# Patient Record
Sex: FEMALE | Race: WHITE | Employment: FULL TIME | URBAN - METROPOLITAN AREA
[De-identification: names, ages, dates, MRNs, and addresses within clinical notes are randomized per-mention and may not be internally consistent; named-entity substitution may affect disease eponyms.]

---

## 2021-10-22 LAB
EXTERNAL ABO GROUPING: NORMAL
EXTERNAL ABO GROUPING: NORMAL
EXTERNAL ANTIBODY SCREEN: NORMAL
EXTERNAL CHLAMYDIA SCREEN: NORMAL
EXTERNAL GONORRHEA SCREEN: NORMAL
EXTERNAL HEMOGLOBIN: 13.6 G/DL
EXTERNAL HEPATITIS B SURFACE ANTIGEN: NORMAL
EXTERNAL HEPATITIS B SURFACE ANTIGEN: NORMAL
EXTERNAL HIV-1/2 AB-AG: NORMAL
EXTERNAL HIV-1/2 AB-AG: NORMAL
EXTERNAL RH FACTOR: POSITIVE
EXTERNAL RH FACTOR: POSITIVE
EXTERNAL SYPHILIS RPR SCREEN: NORMAL
EXTERNAL SYPHILIS RPR SCREEN: NORMAL

## 2022-02-15 ENCOUNTER — TELEPHONE (OUTPATIENT)
Dept: OBGYN CLINIC | Facility: CLINIC | Age: 37
End: 2022-02-15

## 2022-02-15 ENCOUNTER — OFFICE VISIT (OUTPATIENT)
Dept: OBGYN CLINIC | Facility: CLINIC | Age: 37
End: 2022-02-15
Payer: COMMERCIAL

## 2022-02-15 VITALS
BODY MASS INDEX: 23.04 KG/M2 | TEMPERATURE: 98.7 F | WEIGHT: 146.8 LBS | HEIGHT: 67 IN | OXYGEN SATURATION: 98 % | HEART RATE: 84 BPM | DIASTOLIC BLOOD PRESSURE: 78 MMHG | SYSTOLIC BLOOD PRESSURE: 116 MMHG

## 2022-02-15 DIAGNOSIS — Z3A.10 10 WEEKS GESTATION OF PREGNANCY: ICD-10-CM

## 2022-02-15 DIAGNOSIS — Z14.8 GENETIC CARRIER OF OTHER DISEASE: ICD-10-CM

## 2022-02-15 DIAGNOSIS — E03.8 SUBCLINICAL HYPOTHYROIDISM: ICD-10-CM

## 2022-02-15 DIAGNOSIS — O09.521 SUPERVISION OF HIGH RISK ELDERLY MULTIGRAVIDA IN FIRST TRIMESTER: Primary | ICD-10-CM

## 2022-02-15 PROCEDURE — 99203 OFFICE O/P NEW LOW 30 MIN: CPT | Performed by: OBSTETRICS & GYNECOLOGY

## 2022-02-15 PROCEDURE — 76801 OB US < 14 WKS SINGLE FETUS: CPT | Performed by: OBSTETRICS & GYNECOLOGY

## 2022-02-15 RX ORDER — LEVOTHYROXINE SODIUM 25 MCG
25 TABLET ORAL DAILY
COMMUNITY
Start: 2022-02-10 | End: 2022-03-11 | Stop reason: SDUPTHER

## 2022-02-15 RX ORDER — SWAB
1 SWAB, NON-MEDICATED MISCELLANEOUS DAILY
COMMUNITY

## 2022-02-15 NOTE — ASSESSMENT & PLAN NOTE
Some records were able to be obtained from [facility] today: dating, labwork  TVUS today shows SLIUP c/w LMP and established dating   Reviewed relevant history, anticipatory guidance for pregnancy, answered questions regarding exercise, travel, OTC meds for nausea, caffeine intake  NOB folder provided   Return in 1-2 weeks for OB intake at which point additional lab work can be obtained  MFM referral placed for NT/NIPT which pt is interested in - she is not interested in invasive genetic testing   Reviewed benefits of influenza vaccination in pregnancy including but not limited to reduction in maternal influenza hospitalization, reduction in risk of stillbirth, and reduction in influenza-related morbidity and mortality among infants  Reviewed safety of influenza vaccine in pregnancy and overall very low risk of reaction or adverse effects  Patient voiced understanding of all this and DECLINES vaccination today     OB, COVID precautions reviewed

## 2022-02-15 NOTE — PATIENT INSTRUCTIONS
Referral for maternal fetal medicine placed: they will reach out to schedule ultrasound      Pregnancy at 11 to 14 [address] [place] [address] Drive:   What changes are happening in my body? You are now at the end of your first trimester and entering your second trimester  Morning sickness usually goes away by this time  You may have other symptoms such as fatigue, frequent urination, and headaches  You may have gained 2 to 4 pounds by now  How do I care for myself at this stage of my pregnancy? · Get plenty of rest   You may feel more tired than normal  You may need to take naps or go to bed earlier  · Manage nausea and vomiting  Avoid fatty and spicy foods  Eat small meals throughout the day instead of large meals  Mary Beth may help to decrease nausea  Ask your healthcare provider about other ways of decreasing nausea and vomiting  · Eat a variety of healthy foods  Healthy foods include fruits, vegetables, whole-grain breads, low-fat dairy foods, beans, lean meats, and fish  Drink liquids as directed  Ask how much liquid to drink each day and which liquids are best for you  Limit caffeine to less than 200 milligrams each day  Limit your intake of fish to 2 servings each week  Choose fish low in mercury such as canned light tuna, shrimp, salmon, cod, or tilapia  Do not  eat fish high in mercury such as swordfish, tilefish, otto mackerel, and shark  · Take prenatal vitamins as directed  Your need for certain vitamins and minerals, such as folic acid, increases during pregnancy  Prenatal vitamins provide some of the extra vitamins and minerals you need  Prenatal vitamins may also help to decrease the risk of certain birth defects  · Do not smoke  Smoking increases your risk of a miscarriage and other health problems during your pregnancy  Smoking can cause your baby to be born too early or weigh less at birth  Ask your healthcare provider for information if you need help quitting      · Do not drink alcohol  Alcohol passes from your body to your baby through the placenta  It can affect your baby's brain development and cause fetal alcohol syndrome (FAS)  FAS is a group of conditions that causes mental, behavior, and growth problems  · Talk to your healthcare provider before you take any medicines  Many medicines may harm your baby if you take them when you are pregnant  Do not take any medicines, vitamins, herbs, or supplements without first talking to your healthcare provider  Never use illegal or street drugs (such as marijuana or cocaine) while you are pregnant  What are some safety tips during pregnancy? · Avoid hot tubs and saunas  Do not use a hot tub or sauna while you are pregnant, especially during your first trimester  Hot tubs and saunas may raise your baby's temperature and increase the risk of birth defects  · Avoid toxoplasmosis  This is an infection caused by eating raw meat or being around infected cat feces  It can cause birth defects, miscarriages, and other problems  Wash your hands after you touch raw meat  Make sure any meat is well-cooked before you eat it  Avoid raw eggs and unpasteurized milk  Use gloves or ask someone else to clean your cat's litter box while you are pregnant  What changes are happening with my baby? Your baby has fully formed fingernails and toenails  Your baby's heartbeat can now be heard  Ask your healthcare provider if you can listen to your baby's heartbeat  By week 14, your baby is over 4 inches long from the top of the head to the rump (baby's bottom)  Your baby weighs over 3 ounces  What do I need to know about prenatal care? Prenatal care is a series of visits with your healthcare provider throughout your pregnancy  During the first 28 weeks of your pregnancy, you will see your healthcare provider 1 time each month  Prenatal care can help prevent problems during pregnancy and childbirth   Your healthcare provider will check your blood pressure and weight  Your baby's heart rate will also be checked  You may also need the following at some visits:  · A pelvic exam  allows your healthcare provider to see your cervix (the bottom part of your uterus)  Your healthcare provider will use a speculum to open your vagina  He or she will check the size and shape of your uterus  · Blood tests  may be done to check for any of the following:    ? Gestational diabetes or anemia (low iron level)    ? Blood type or Rh factor, or certain birth defects    ? Immunity to certain diseases, such as chickenpox or rubella    ? An infection, such as a sexually transmitted infection, HIV, or hepatitis B    · Hepatitis B  may need to be prevented or treated  Hepatitis B is inflammation of the liver caused by the hepatitis B virus (HBV)  HBV can spread from a mother to her baby during delivery  You will be checked for HBV as early as possible in the first trimester of each pregnancy  You need the test even if you received the hepatitis B vaccine or were tested before  You may need to have an HBV infection treated before you give birth  · Urine tests  may also be done to check for sugar and protein  These can be signs of gestational diabetes or preeclampsia  Urine tests may also be done to check for signs of infection  · A fetal ultrasound  shows pictures of your baby inside your uterus  The pictures are used to check your baby's development, movement, and position  · Genetic disorder screening tests  may be offered to you  These tests check your baby's risk for genetic disorders such as Down syndrome  A screening test includes a blood test and ultrasound  When should I seek immediate care? · You have pain or cramping in your abdomen or low back  · You have heavy vaginal bleeding or clotting  · You pass material that looks like tissue or large clots  Collect the material and bring it with you  When should I call my doctor or obstetrician? · You cannot keep food or drinks down, and you are losing weight  · You have light bleeding  · You have chills or a fever  · You have vaginal itching, burning, or pain  · You have yellow, green, white, or foul-smelling vaginal discharge  · You have pain or burning when you urinate, less urine than usual, or pink or bloody urine  · You have questions or concerns about your condition or care  CARE AGREEMENT:   You have the right to help plan your care  Learn about your health condition and how it may be treated  Discuss treatment options with your healthcare providers to decide what care you want to receive  You always have the right to refuse treatment  The above information is an  only  It is not intended as medical advice for individual conditions or treatments  Talk to your doctor, nurse or pharmacist before following any medical regimen to see if it is safe and effective for you  © Copyright [employer] 2021 Information is for End User's use only and may not be sold, redistributed or otherwise used for commercial purposes   All illustrations and images included in CareNotes® are the copyrighted property of A D A M , Inc  or [address] [place] [address] [employer]

## 2022-02-15 NOTE — TELEPHONE ENCOUNTER
Record release form filled out and signed by patient  Faxed and received confirmation fax sheet  TFFLE:[phone]

## 2022-02-15 NOTE — PROGRESS NOTES
Subjective   Patient ID: Sarah Miner is a 29 y o  female  Patient is here for a problem visit  Chief Complaint   Patient presents with    Initial Prenatal Visit     Pt has no records from [facility] yet, pt has no concerns, LMP 21     New patient - establishing from [facility]  Was able to get some records faxed during visit   LMP 21 - KYREE 22, 10+0 WGA   KYREE 9/15/22 per [facility] - 9+5 WGA by ovulation date     Referred to [facility] for low AMH - pt had egg freezing done in 2021  Then spontaneously conceived 3 months after that  Has been going to [facility] for serial US and labwork  She was not on any supplemental progesterone or other medication  Last US at [facility] [facility] approximately 1 week ago  Had carrier screening prior to conceiving - 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  Partner has not been tested yet  She reports undergoing telephone genetic counseling for this  Reports fatigue, nausea, GERD but very rare vomiting  Denies cramping or VB  Menstrual History:  OB History        2    Para   0    Term   0       0    AB   1    Living   0       SAB   0    IAB   0    Ectopic   0    Multiple   0    Live Births   0                  Patient's last menstrual period was 2021 (exact date)           Past Medical History:   Diagnosis Date    Hypothyroidism        Past Surgical History:   Procedure Laterality Date    STRABISMUS SURGERY         Social History     Tobacco Use    Smoking status: Never Smoker    Smokeless tobacco: Never Used   Vaping Use    Vaping Use: Never used   Substance Use Topics    Alcohol use: Never    Drug use: Never        No Known Allergies      Current Outpatient Medications:     Calcium Carbonate Antacid (TUMS PO), Take by mouth, Disp: , Rfl:     Prenatal Vit-Fe Fumarate-FA (prenatal multivitamin) 28-0 8 MG TABS, Take 1 tablet by mouth daily, Disp: , Rfl:     Synthroid 25 MCG tablet, Take 25 mcg by mouth daily, Disp: , Rfl:       Review of Systems Constitutional: Negative for chills and fever  Eyes: Negative for visual disturbance  Respiratory: Negative for chest tightness and shortness of breath  Cardiovascular: Negative for chest pain  Gastrointestinal: Negative for abdominal pain, diarrhea, nausea and vomiting  Genitourinary: Negative for pelvic pain and vaginal bleeding  As noted in HPI   Skin: Negative for rash  Neurological: Negative for headaches  All other systems reviewed and are negative  /78 (BP Location: Left arm, Patient Position: Sitting, Cuff Size: Adult)   Pulse 84   Temp 98 7 °F (37 1 °C) (Tympanic)   Ht 5' 7" (1 702 m)   Wt 66 6 kg (146 lb 12 8 oz)   LMP 12/07/2021 (Exact Date)   SpO2 98%   Breastfeeding No   BMI 22 99 kg/m²       Physical Exam  Constitutional:       General: She is not in acute distress  Appearance: Normal appearance  HENT:      Head: Normocephalic and atraumatic  Cardiovascular:      Rate and Rhythm: Normal rate  Pulmonary:      Effort: Pulmonary effort is normal  No respiratory distress  Abdominal:      General: There is no distension  Palpations: Abdomen is soft  Tenderness: There is no abdominal tenderness  There is no guarding or rebound  Neurological:      General: No focal deficit present  Mental Status: She is alert  Psychiatric:         Mood and Affect: Mood normal          Behavior: Behavior normal    Vitals and nursing note reviewed             Results for orders placed or performed in visit on 02/15/22   Chlamydia/GC amplified DNA by PCR   Result Value Ref Range    External Gonorrhea Screen neg     External Chlamydia Screen neg    Hemoglobin   Result Value Ref Range    External Hemoglobin 13 6 g/dL   HIV 1/2 Antigen/Antibody (4th Generation) w Reflex [facility]   Result Value Ref Range    HIV-1/HIV-2 AB Non-Reactive    Hepatitis B surface antigen   Result Value Ref Range    Hepatitis B Surface Ag neg    RPR   Result Value Ref Range    RPR Non-Reactive    Antibody screen   Result Value Ref Range    External Antibody Screen Normal    ABO/Rh   Result Value Ref Range    ABO Grouping A     External Rh Factor Positive        Appropriate laboratory testing, imaging studies, and prior external records were reviewed    US < 14 weeks     Gestational sac present  Yolk sac present  Fetal pole present  Fetal cardiac activity noted  FHR: 168 bpm     CRL: 9+4 WGA  days  EDC by US 9/16/22  EDC by LMP 9/13/22      L ovary normal  R ovary normal    Ultrasound Probe Disinfection     A transvaginal ultrasound was performed  Prior to use, disinfection was performed with High Level Disinfection Process ([employer] (formerly known as [employer]))  Probe serial number F: [id number] was used  Assessment/Plan:       Problem List        Endocrine    Subclinical hypothyroidism    Overview     TSH 1/10/22 3 22, synthroid 25 mcg started by A  TSH 2/10/22 2 97         Current Assessment & Plan     Taking synthroid 25 mcg daily             Other    Supervision of high risk elderly multigravida in first trimester    Overview     A reports KYREE by ovulation date as 9/15/22  KYREE by LMP is 9/13/22   Conceived spontaneously, no infertility tx required  S/p COVID vax x 2, declines booster  Declines flu vaccine   Interested in NIPT          Current Assessment & Plan     Some records were able to be obtained from [facility] today: dating, labwork  TVUS today shows SLIUP c/w LMP and established dating   Reviewed relevant history, anticipatory guidance for pregnancy, answered questions regarding exercise, travel, OTC meds for nausea, caffeine intake  NOB folder provided   Return in 1-2 weeks for OB intake at which point additional lab work can be obtained   MFM referral placed for NT/NIPT which pt is interested in - she is not interested in invasive genetic testing   Reviewed benefits of influenza vaccination in pregnancy including but not limited to reduction in maternal influenza hospitalization, reduction in risk of stillbirth, and reduction in influenza-related morbidity and mortality among infants  Reviewed safety of influenza vaccine in pregnancy and overall very low risk of reaction or adverse effects  Patient voiced understanding of all this and DECLINES vaccination today     OB, COVID precautions reviewed            10 weeks gestation of pregnancy    Genetic carrier of other disease    Overview     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

## 2022-02-16 ENCOUNTER — OB ABSTRACT (OUTPATIENT)
Dept: OBGYN CLINIC | Facility: CLINIC | Age: 37
End: 2022-02-16

## 2022-02-24 ENCOUNTER — TELEPHONE (OUTPATIENT)
Dept: OBGYN CLINIC | Facility: CLINIC | Age: 37
End: 2022-02-24

## 2022-02-24 NOTE — TELEPHONE ENCOUNTER
The patient called and asked that if the Synthroid she is currently on would have to be adjusted since she is pregnant  Was told by her doctor that once she was in the second trimester it would be adjusted  Please advise  I do not see any labs in for TSH

## 2022-03-08 ENCOUNTER — NURSE TRIAGE (OUTPATIENT)
Dept: OTHER | Facility: OTHER | Age: 37
End: 2022-03-08

## 2022-03-09 PROBLEM — Z3A.13 13 WEEKS GESTATION OF PREGNANCY: Status: ACTIVE | Noted: 2022-02-15

## 2022-03-09 NOTE — TELEPHONE ENCOUNTER
Reason for Disposition   SPOTTING (single or brief episode)    Answer Assessment - Initial Assessment Questions  1  ONSET: "When did this bleeding start?"        Spotting started a few minutes ago    2  DESCRIPTION: "Describe the bleeding that you are having " "How much bleeding is there?"     - SPOTTING: spotting, or pinkish / brownish mucous discharge; does not fill panti-liner or pad     - MILD:  less than 1 pad / hour; less than patient's usual menstrual bleeding    - MODERATE: 1-2 pads / hour; 1 menstrual cup every 6 hours; small-medium blood clots (e g , pea, grape, small coin)    - SEVERE: soaking 2 or more pads/hour for 2 or more hours; 1 menstrual cup every 2 hours; bleeding not contained by pads or continuous red blood from vagina; large blood clots (e g , golf ball, large coin)       Spotting    3  ABDOMINAL PAIN SEVERITY: If present, ask: "How bad is it?"  (e g , Scale 1-10; mild, moderate, or severe)    - MILD (1-3): doesn't interfere with normal activities, abdomen soft and not tender to touch     - MODERATE (4-7): interferes with normal activities or awakens from sleep, tender to touch     - SEVERE (8-10): excruciating pain, doubled over, unable to do any normal activities      Denies     4  PREGNANCY: "Do you know how many weeks or months pregnant you are?" "When was the first day of your last normal menstrual period?"      12w5d pregnant    5  HEMODYNAMIC STATUS: "Are you weak or feeling lightheaded?" If Yes, ask: "Can you stand and walk normally?"       Denies, can stand and walk normally    6   OTHER SYMPTOMS: "What other symptoms are you having with the bleeding?" (e g , passed tissue, vaginal discharge, fever, menstrual-type cramps)      Nausea due to anxiety per pt    Protocols used: PREGNANCY - VAGINAL BLEEDING LESS THAN 20 WEEKS [facility]

## 2022-03-09 NOTE — TELEPHONE ENCOUNTER
Spoke with pt who states spotting has not happened again  She is currently out of state and is scheudled to fly back today, and already has her OB intake/exam scheduled for tomorrow   Advised pt to keep appt as is and we will see her tomorrow and to call if anything changes

## 2022-03-09 NOTE — PROGRESS NOTES
Subjective  Patient ID: Mary See is a 29 y o  female here for OB intake  She is accompanied by: FOB    Pregnancy was planned  Reports the follow common c/o in early pregnancy: breast tenderness, fatigue and nausea  Her Patient's last menstrual period was 2021 (exact date)  giving her an KYREE of 9/15/22  She reports her menstrual cycle is regular    She has had a pregnancy confirmation appointment and previous US on 2/15 Demonstrated viable IUP c/w GA by LMP    Obstetric history reviewed/updated:  OB History    Para Term  AB Living   2 0 0 0 1 0   SAB IAB Ectopic Multiple Live Births   0 0 0 0 0      # Outcome Date GA Lbr Hari/2nd Weight Sex Delivery Anes PTL Lv   2 Current            1 AB 2019 9w0d    SAB          Previous Pregnancy Complications/Hx: none    The following portions of the patient's history were reviewed and updated as appropriate: allergies, current medications, past family history, past medical history, past social history, past surgical history, and problem list   + for subclinical hypothyroid- started on meds by infertility specialist    Diabetes risk Factors: The following hx was assessed r/t risk for diabetes in pregnancy: Pregestational DM, hx of GDM, BMI >35, 1st degree relative w/ T2 DM, personal hx of PCOS, Metformin use, Prior baby LGA/macrosomia  Pertinent positive: none     Hypertension  The following hx was assessed r/t risk for HTN disorder in pregnancy:   (Risk level High) prior hx of CHTN, gHTN, Pre-eclampsia (or eclampsia or HELLP syndrome), FH  pre-eclampsia, Multifetal gestation, Type 1 or Type 2 DM, renal disease, Autoimmune disease, Nulliparity;   (Risk level Mod) BMI > 30, > age 28, > 10 yr pregnancy interval, Previous IUGR/SGA baby, race  Pertinent positive: AMA    Family genetic history completed: unremarkable other than AMA     Pre-Pregnancy weight: 61 7 kg (136 lb)  Pre-gravid BMI: 21 30        Infection/Exposure Risk assessment  Employed: yes  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  no  Recent COVID Infection or exposure:   no  Recent Travel Screening  Traveled outside the [address] [place] [address] Road  in the last month?: No  Planned Travel Screening  Planned travel outside the [address] [place] [address] Road  in the next 12 months?: No  Immunizations:   Vaccinated against Hep B: yes   Previous VZV vaccine or chicken pox disease  yes   influenza vaccine given this flu season: no    Covid-19/SARS vaccine: yes   Discussed Tdap vaccine administration at 27-28 weeks    Substance and Domestic Abuse Screening  None   SBIRT  Have you ever used drugs or Alcohol during Pregnancy? : No  Have you had a problem with drugs or alcohol in the Past? : No  Does your partner have a problem with drugs or alcohol? : No  Do you consider one of your parents to be an addict or alcoholic? : No    Abuse and Domestic Violence Screen   Are you safe at home? : Yes  Is anyone hurting you? : No    Depression screening     PHQ-A Depression Screening    Feeling down, depressed, irritable or hopeless: 0 - not at all  Little interest or pleasure in doing things: 0 - not at all        ____________________________________________           LMP 2021 (Exact Date)   PE N/A-see other visit same day          Assessment/Plan:         OB intake completed  She is a  with Patient's last menstrual period was 2021 (exact date)  I reviewed risk factors for pregnancy based on her medical history     Diabetes risk Factors: none  Pre-eclampsia Risk Factors: AMA,     Additional risks/problems Identified:     Aneuploidy/Congenital defects risk factors: higher  Discussed genetic screening/testing options- pt interested yes   For the low risk patient- counseled on cell free DNA vs sequential screening   SMA  No- already tested   Cystic Fibrosis Testing  No - already tested     Hemoglobin electrophoresis was not indicated    Prenatal lab work reviewed  Reviewed routine US to be expected in pregnancy and [facility] NT/Level 2 Us/consult were ordered  Pt does not have [place] and thus Anatoly Rahman Was Not initiated  Pregnancy Education  [facility] Pregnancy Essentials Book reviewed and discussed  Call group and instructions to call the office/answering service in case of an emergency  Discussed appropriate weight gain in pregnancy based on pre-gravid BMI  Discussed healthy lifestyle choices for pregnancy     OB packet/Handouts given addressing   Nutrition, Immunizations, and Medications during pregnancy   Warning Signs During Pregnancy   Baby and Me phone yuki guide and support center  [address] and Ultrasounds in Pregnancy    CoronaVirus and Zika precautions discussed and encouraged patient to visit CDC website for up-to-date information  Warning signs reviewed as well as reasons to call          Problem List Items Addressed This Visit        pregnancy    13 weeks gestation of pregnancy    Genetic carrier of other disease    Spotting affecting pregnancy in first trimester    Subclinical hypothyroidism - Primary    Supervision of high risk elderly multigravida in first trimester          Orders Placed This Encounter   Procedures    HIV 1/2 Antigen/Antibody (4th Generation) w Reflex [facility]    Hepatitis B surface antigen    RPR    ABO/Rh

## 2022-03-09 NOTE — TELEPHONE ENCOUNTER
Regardin weeks, spotting   ----- Message from Edwige Brumfield sent at 3/8/2022  9:48 PM EST -----  " I am a little under 13 weeks and I am experiencing some spotting "

## 2022-03-09 NOTE — PROGRESS NOTES
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    4  13 weeks gestation of pregnancy    5  Spotting affecting pregnancy in first trimester         Objective:  Patient's last menstrual period was 2021 (exact date)  /68 (BP Location: Left arm, Patient Position: Sitting, Cuff Size: Adult)   Pulse 91   Temp 98 °F (36 7 °C) (Tympanic)   Ht 5' 7" (1 702 m)   Wt 68 7 kg (151 lb 6 4 oz)   LMP 2021 (Exact Date)   SpO2 96%   BMI 23 71 kg/m²   Pregravid Weight/BMI: 61 7 kg (136 lb) (BMI 21 30)      OBGyn Exam- see prenatal physical form  FIRST TRIMESTER OBSTETRIC ULTRASOUND     Patient's last menstrual period was 2021 (exact date)  INDICATION: spotting in pregnancy, FHR     TECHNIQUE:   Transvaginal imaging was performed to assess the fetal cardiac activity     FINDINGS:  A single intrauterine gestation is identified  Gestational Sac: Present and is  normal appearing   Mean Crown-Rump Length:  Not perfomred today  Cardiac activity is detected at 147  Small subchorionic hypoechoic area likely representing [address]  Adnexa:  No adnexal mass or pathologic cyst   Cul de Sac:  No significant free fluid identified     IMPRESSION:  Single intrauterine pregnancy of 13 weeks 0 days gestational age  Fetal cardiac activity detected  No adnexal masses seen  Ultrasound Probe Disinfection    A transvaginal ultrasound was performed     Prior to use, disinfection was performed with High Level Disinfection Process (Trophon)  Probe serial number F: [id number] was used  Assessment & Plan:    1  Supervision of high risk elderly multigravida in first trimester  Assessment & Plan:    She is a  at 13w1d  Viability previously established  US today with small [address] noted, likely explaining bleeding she had  New OB Exam completed  Pap is indicated and gonorrhea/chlamydia cultures collected  See other A&P for risk factors/identified    I reviewed the expected course of the pregnancy with visits, labs and additional testing  The patient has obtained her prenatal labs through Infertitliy specialist- reviewed and missing rubella  Ordered today  Genetic screening/testing options again reviewed and she elects for average risk NIPT     I have reviewed the maternal as well as infant benefits of breastfeeding with the patient  The patient currently plans to breastfeed  I have reviewed proper nutrition in pregnancy as well as exercise and safe medications that can be used for various common symptoms in pregnancy  I reviewed the reasons to call in the first trimester - namely vaginal bleeding, severe nausea and vomiting, severe pelvic pain, fevers or pain/burning with urination  She was already previously referred to [facility] for NT US and will scheduled for level 2 G&A for 20-21 wks after that appt  I recommended that the patient receive a flu vaccine during flu season  All questions were answered to her satisfaction  Orders:  -     Rubella antibody, IgG; Future  -     Urine culture; Future; Expected date: 03/10/2022  -     CBC; Future  -     Type and screen; Future    2  Subclinical hypothyroidism  Assessment & Plan:  Last TSH 2 97 on 25 mcg synthroid  TSH ordered and will recheck q 4-6 wks    Orders:  -     TSH, 3rd generation with Free T4 reflex; Future    3  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

## 2022-03-10 ENCOUNTER — ROUTINE PRENATAL (OUTPATIENT)
Dept: PERINATAL CARE | Facility: OTHER | Age: 37
End: 2022-03-10
Payer: COMMERCIAL

## 2022-03-10 ENCOUNTER — INITIAL PRENATAL (OUTPATIENT)
Dept: OBGYN CLINIC | Facility: CLINIC | Age: 37
End: 2022-03-10

## 2022-03-10 ENCOUNTER — TELEPHONE (OUTPATIENT)
Dept: PERINATAL CARE | Facility: CLINIC | Age: 37
End: 2022-03-10

## 2022-03-10 ENCOUNTER — APPOINTMENT (OUTPATIENT)
Dept: LAB | Facility: CLINIC | Age: 37
End: 2022-03-10
Payer: COMMERCIAL

## 2022-03-10 VITALS
TEMPERATURE: 98 F | HEART RATE: 91 BPM | HEIGHT: 67 IN | SYSTOLIC BLOOD PRESSURE: 104 MMHG | OXYGEN SATURATION: 96 % | BODY MASS INDEX: 23.76 KG/M2 | WEIGHT: 151.4 LBS | DIASTOLIC BLOOD PRESSURE: 68 MMHG

## 2022-03-10 VITALS
SYSTOLIC BLOOD PRESSURE: 95 MMHG | HEIGHT: 67 IN | HEART RATE: 83 BPM | DIASTOLIC BLOOD PRESSURE: 66 MMHG | BODY MASS INDEX: 23.73 KG/M2 | WEIGHT: 151.2 LBS

## 2022-03-10 DIAGNOSIS — O09.521 SUPERVISION OF HIGH RISK ELDERLY MULTIGRAVIDA IN FIRST TRIMESTER: ICD-10-CM

## 2022-03-10 DIAGNOSIS — O26.851 SPOTTING AFFECTING PREGNANCY IN FIRST TRIMESTER: ICD-10-CM

## 2022-03-10 DIAGNOSIS — Z14.8 GENETIC CARRIER OF OTHER DISEASE: ICD-10-CM

## 2022-03-10 DIAGNOSIS — O09.521 SUPERVISION OF HIGH RISK ELDERLY MULTIGRAVIDA IN FIRST TRIMESTER: Primary | ICD-10-CM

## 2022-03-10 DIAGNOSIS — Z3A.13 13 WEEKS GESTATION OF PREGNANCY: ICD-10-CM

## 2022-03-10 DIAGNOSIS — E03.8 SUBCLINICAL HYPOTHYROIDISM: Primary | ICD-10-CM

## 2022-03-10 DIAGNOSIS — E03.8 SUBCLINICAL HYPOTHYROIDISM: ICD-10-CM

## 2022-03-10 DIAGNOSIS — O09.521 ELDERLY MULTIGRAVIDA, FIRST TRIMESTER: Primary | ICD-10-CM

## 2022-03-10 DIAGNOSIS — Z12.4 SCREENING FOR CERVICAL CANCER: ICD-10-CM

## 2022-03-10 LAB
ABO GROUP BLD: NORMAL
BLD GP AB SCN SERPL QL: NEGATIVE
ERYTHROCYTE [DISTWIDTH] IN BLOOD BY AUTOMATED COUNT: 13.5 % (ref 11.6–15.1)
HCT VFR BLD AUTO: 36.5 % (ref 34.8–46.1)
HGB BLD-MCNC: 12.3 G/DL (ref 11.5–15.4)
MCH RBC QN AUTO: 30.1 PG (ref 26.8–34.3)
MCHC RBC AUTO-ENTMCNC: 33.7 G/DL (ref 31.4–37.4)
MCV RBC AUTO: 89 FL (ref 82–98)
PLATELET # BLD AUTO: 239 THOUSANDS/UL (ref 149–390)
PMV BLD AUTO: 12 FL (ref 8.9–12.7)
RBC # BLD AUTO: 4.09 MILLION/UL (ref 3.81–5.12)
RH BLD: POSITIVE
RUBV IGG SERPL IA-ACNC: 39 IU/ML
SL AMB  POCT GLUCOSE, UA: NEGATIVE
SL AMB POCT URINE PROTEIN: NEGATIVE
SPECIMEN EXPIRATION DATE: NORMAL
TSH SERPL DL<=0.05 MIU/L-ACNC: 2.3 UIU/ML (ref 0.36–3.74)
WBC # BLD AUTO: 6.62 THOUSAND/UL (ref 4.31–10.16)

## 2022-03-10 PROCEDURE — 99203 OFFICE O/P NEW LOW 30 MIN: CPT | Performed by: OBSTETRICS & GYNECOLOGY

## 2022-03-10 PROCEDURE — 85027 COMPLETE CBC AUTOMATED: CPT

## 2022-03-10 PROCEDURE — 87086 URINE CULTURE/COLONY COUNT: CPT | Performed by: CLINICAL NURSE SPECIALIST

## 2022-03-10 PROCEDURE — 86850 RBC ANTIBODY SCREEN: CPT

## 2022-03-10 PROCEDURE — 36415 COLL VENOUS BLD VENIPUNCTURE: CPT

## 2022-03-10 PROCEDURE — 86900 BLOOD TYPING SEROLOGIC ABO: CPT

## 2022-03-10 PROCEDURE — PNV: Performed by: CLINICAL NURSE SPECIALIST

## 2022-03-10 PROCEDURE — 76801 OB US < 14 WKS SINGLE FETUS: CPT | Performed by: OBSTETRICS & GYNECOLOGY

## 2022-03-10 PROCEDURE — OBC: Performed by: CLINICAL NURSE SPECIALIST

## 2022-03-10 PROCEDURE — G0476 HPV COMBO ASSAY CA SCREEN: HCPCS | Performed by: CLINICAL NURSE SPECIALIST

## 2022-03-10 PROCEDURE — G0145 SCR C/V CYTO,THINLAYER,RESCR: HCPCS | Performed by: CLINICAL NURSE SPECIALIST

## 2022-03-10 PROCEDURE — 86762 RUBELLA ANTIBODY: CPT

## 2022-03-10 PROCEDURE — 76813 OB US NUCHAL MEAS 1 GEST: CPT | Performed by: OBSTETRICS & GYNECOLOGY

## 2022-03-10 PROCEDURE — 84443 ASSAY THYROID STIM HORMONE: CPT

## 2022-03-10 PROCEDURE — 86901 BLOOD TYPING SEROLOGIC RH(D): CPT

## 2022-03-10 RX ORDER — ASPIRIN 81 MG/1
162 TABLET, CHEWABLE ORAL DAILY
Qty: 180 TABLET | Refills: 1 | Status: SHIPPED | OUTPATIENT
Start: 2022-03-10 | End: 2022-06-08

## 2022-03-10 NOTE — ASSESSMENT & PLAN NOTE
She is a  at [address] previously established  [address] today with small [address] noted, likely explaining bleeding she had  New OB Exam completed  Pap is indicated and gonorrhea/chlamydia cultures collected  See other A&P for risk factors/identified    I reviewed the expected course of the pregnancy with visits, labs and additional testing  The patient has obtained her prenatal labs through Infertitliy specialist- reviewed and missing rubella  Ordered today  Genetic screening/testing options again reviewed and she elects for average risk NIPT     I have reviewed the maternal as well as infant benefits of breastfeeding with the patient  The patient currently plans to breastfeed  I have reviewed proper nutrition in pregnancy as well as exercise and safe medications that can be used for various common symptoms in pregnancy  I reviewed the reasons to call in the first trimester - namely vaginal bleeding, severe nausea and vomiting, severe pelvic pain, fevers or pain/burning with urination  She was already previously referred to [facility] for NT US and will scheduled for level 2 G&A for 20-21 wks after that appt  I recommended that the patient receive a flu vaccine during flu season  All questions were answered to her satisfaction

## 2022-03-10 NOTE — PROGRESS NOTES
Patient chose to have [address] screen  Instructed patient on process for checking her OOP cost via [employer] [facility]  Provided [id number] instruction card toll free # [phone]  Patient made aware if [id number]  unable to give an estimate she will need to contact M office prior to blood draw  Patient aware that  is provided by third party and is only an estimate of cost not a guarantee  Insurance may require prior authorization, authorization may take up to 14 business days  Authorization is not a guarantee of payment  Patient notified if test drawn without prior authorization she may be responsible for full cost of test   For definitive OOP cost, lab deductible or if lab authorization is required patient encouraged to call her insurance provider  Explained customer service insurance phone # located on the back of her ID card  Maternal Fetal Medicine will have results in approximately 7-10 business days and will call patient or notify via [address]  Patient aware viewing lab result online will reveal gender  [id number] lab kit with prepaid [employer]  given to patient  Patient verbalized understanding of all instructions and no questions at this time

## 2022-03-10 NOTE — ASSESSMENT & PLAN NOTE
Small amt spotting 2 days ago  Exam today wnl- no blood in vagina  US with possible small [address] noted  Reviewed most pregnancies go on to continue normally, but can increase risk forSAB  Will monitor

## 2022-03-10 NOTE — TELEPHONE ENCOUNTER
Called [facility]gr[employer] at [phone] to inquire if prior authorization is required for NIPT (21262)  Automated system states authorization is not required and confirmation # is [id number]  Left [facility] for pt to inform her prior authorization is not required for her NIPT  PT instructed to check her OOP cost/deductible using  or insurance prior to blood work  Pt instructed to contact office with questions

## 2022-03-10 NOTE — PATIENT INSTRUCTIONS
Again, congratulations on your pregnancy! NEXT STEPS   Get Prenatal bloodwork if not already done   Call [facility] to schedule next ultrasound (done 12-13 wks)   o Contact information for [facility])- Main Number [phone]    Return to our office in 4 weeks for routine prenatal visit (or sooner if any problems/concerns arise- see packet for things to report)   Check out [facility] website to read the "Pregnancy Essentials Guide"      It can be found by going to [employer]-->select services-->select women's health-->select Obstetrics  [email or web address]  ----------------------------------------------------  [address] [facility]  [address]  [place], [address]    Warning Signs During Pregnancy  The list below includes warning signs your providers would like you to be aware of  If you experience any of these at any time during your pregnancy, please call us as soon as possible   Vaginal bleeding   Sharp abdominal pain that does not go away   Fever (more than 100  4? F and is not relieved with Tylenol)   Persistent vomiting lasting greater than 24 hours   Chest pain/Shortness of breath   Pain or burning when you urinate    Call the OFFICE [phone] for any questions/emergencies  At night or on the weekend, calls go through a triage service, please indicate it is an emergency and the DOCTOR on call will be paged    ---------------------------------------------------------------    Discomforts of Early Pregnancy  Tips for coping with nausea and vomiting during pregnancy   Eat meals and snacks slowly   Eat every 1-2 hours to avoid a full stomach   Dont skip meals, avoid empty stomach   Eat a snack prior to getting out of bed   Avoid food and beverages with a strong aroma   Avoid dehydration - drink enough fluid to keep the urine pale yellow   Drink fluids before a meal to minimize the effect of a full stomach   Limit the amount of coffee and beverages that contain caffeine   Eliminate spicy, odorous, high fat (fried foods), acidic (tomato products) and sweet foods   Fluids that contain lemon (lemonade), mint (tea) or orange can usually be well tolerated   Snacks and meals that contain low-fat protein (lean meats, fish, poultry and eggs) along with eating easily digestible carbs (fruit, rice, toast, crackers and dry cereal) may be tolerated better   Foods with ginger may be well tolerated  May use ginger root powder, capsules or extract (up to 1000 mg per day)   Drink liquids in small amounts    If symptoms persist, please contact your provider  Tips for coping with constipation during pregnancy   Increase fiber and fluids   - Drink 8-10 cups of liquid, like water or low-sugar juice daily  - Keep urine pale with fluids (water, milk), fruit and vegetables   Eat a well-balanced diet that contains high fiber food (fruits, vegetables, whole grain breads and cereals, bran and dried beans)   Take a 30-minute walk daily   You may take a mild stool softener such as Colace®    If symptoms persist, please contact your provider  For any emergencies, PLEASE CALL THE OFFICE at [phone]  If the office is closed, the doctor on call will be paged by the answering service  Medications and Pregnancy- see handout in packetExpected Weight Gain During Pregnancy  If you have a healthy BMI (18-25) prior to pregnancy:  The recommended weight gain is between 25-35 pounds  Approximate weight gain  in the first trimester is 1-4 5 pounds  An expected weight gain during the second and  third trimester is approximately one pound per week  If you have a BMI of less than 18 prior to pregnancy,  you are considered underweight:  The recommended weight gain is between 28-40 pounds  Approximate weight gain  in the first trimester is 1-4 5 pounds   An expected weight gain during the second and  third trimester is just over one pound per week  If your BMI is 25 to 29 9: you are considered overweight:  You should gain 1/2 to 2/3 pound during the second and third trimester, for a total  weight gain of 15 to 25 pounds  If you have a BMI of greater than 30 prior to pregnancy,  you are considered overweight:  The recommended weight gain is between 15-25 pounds  Approximate weight gain  in the first trimester is 1-4 5 pounds  An expected weight gain during the second  and third trimester is approximately 0 5 pound per week  Foods to avoid during pregnancy:   Unpasteurized milk, juice and cheese  - Soft cheeses like feta or brie (if made with UNPASTEURIZED milk)   Unheated deli meats like lunchmeat and hotdogs   Undercooked poultry, beef, pork, seafood including raw sushi    What fish is safe to eat during pregnancy? Eat 8 to 12 ounces of fish a week  Pick from this group frequently, especially if you follow  the American Heart Associations recommendation to eat fish at least 2 times a week  AVOID HIGH MERCURY FISH  A single meal of the following fish can put  you over the Environmental Protection  Agencys safe limit for the month  High mercury fish:  Shark  Swordfish  [employer]  Tile Fish    Caffeine and Pregnancy  The [employer] and [employer] of Obstetrics and Gynecologists (ACOG) urge pregnant  women to limit their caffeine consumption to no more than 200 milligrams (mg) per day  This is  comparable to having one 12-ounce cup of coffee a day  This level has been shown not to increase risk  of miscarriage, growth or  labor complications  Effects of higher levels are not known  Exercise During Pregnancy  A daily exercise program that consists of 30 minutes a day is recommended     Low impact exercises like walking and swimming are great exercises throughout  all of pregnancy   If youre an avid strength  avoid lifting very heavy weights - nothing more  than 30 pounds    Drink plenty of fluids while exercising to stay hydrated  Be careful to avoid overheating  ACTIVITIES TO AVOID   Exercises that can make you lose your balance   Activities that can put your baby at risk i e  horseback riding, scuba diving, skiing  or snowboarding  Any other sport that puts you at risk for getting hit in the  abdominal area   Do not use saunas, steam rooms or hot tubs (that have a higher temperature  than 100F)   After the first trimester, avoid exercises that require you to lay flat on your back   Avoid exceeding a heart rate greater than 140 beats per minute  As long as you are  able to hold a conversation while exercising your heart rate is likely acceptable    Vaccines and Pregnancy    Information for pregnant women  Vaccines help protect you and your baby against serious diseases  Whooping Cough Vaccine  Whooping cough (or pertussis) can be serious for anyone, but for your , it can be lifethreatening  Up to 20 babies die each year in the [facility] due to whooping cough  When you get the whooping cough vaccine during your pregnancy, your body will create protective  antibodies and pass some of them to your baby before birth  These antibodies will provide your  baby some short-term, early protection against whooping cough  Learn more at www cdc gov/pertussis/pregnant/     Flu Vaccine  Changes in your immune, heart, and lung functions during pregnancy make you more likely to get  seriously ill from the flu  Catching the flu also increases your chances for serious problems for your  developing baby, including premature labor and delivery  Get the flu shot if you are pregnant during  flu season--its the best way to protect yourself and your baby for several months after birth from flu-related  complications  Flu seasons vary in their timing from season to season, but CDC recommends getting vaccinated  by the end of October, if possible  This timing helps protect you before flu activity begins to increase  Find more on how to prevent the flu by visiting www cdc gov/flu/     Covid Vaccine  Similar to the flu, Changes in your immune, heart, and lung functions during pregnancy make you more likely to get  seriously ill from COVID  It  also increases your chances for serious problems for your  developing baby, including premature labor and delivery  Please consider getting your covid vaccine if you haven't already  This is endorsed by both Juanjo Peters of Obstetrics and Gynecology and Deonna Dobbins of Maternal Fetal Medicine    Fetal Activity  You will most likely start to feel your baby move (also known as quickening) between  25 and 20 weeks of pregnancy  First time moms might feel their babys movements  closer to 25 weeks while a second time mom might feel those first movements closer  to 16 weeks

## 2022-03-10 NOTE — PROGRESS NOTES
Please refer to the [facility] ultrasound report in Ob Procedures for additional information regarding today's visit

## 2022-03-10 NOTE — LETTER
March 10, 2022     Mary President, [address] [facility],[address] [facility]  [address], [facility] [address] [id number]    Patient: Edwige Card   YOB: 1985   Date of Visit: 3/10/2022       Dear Dr Kyle Uiras: Thank you for referring Edwige Card to me for evaluation  Below are my notes for this consultation  If you have questions, please do not hesitate to call me  I look forward to following your patient along with you  Sincerely,        Dominique Curran MD        CC: No Recipients  Dominique Curran MD  3/9/2022  7:21 PM  Sign when Signing Visit  Please refer to the [facility] ultrasound report in Ob Procedures for additional information regarding today's visit

## 2022-03-11 DIAGNOSIS — E03.8 OTHER SPECIFIED HYPOTHYROIDISM: Primary | ICD-10-CM

## 2022-03-11 LAB — BACTERIA UR CULT: NORMAL

## 2022-03-11 RX ORDER — LEVOTHYROXINE SODIUM 25 MCG
25 TABLET ORAL DAILY
Qty: 30 TABLET | Refills: 11 | Status: SHIPPED | OUTPATIENT
Start: 2022-03-11 | End: 2022-04-05 | Stop reason: SDUPTHER

## 2022-03-12 LAB
HPV HR 12 DNA CVX QL NAA+PROBE: NEGATIVE
HPV16 DNA CVX QL NAA+PROBE: NEGATIVE
HPV18 DNA CVX QL NAA+PROBE: NEGATIVE

## 2022-03-16 LAB
LAB AP GYN PRIMARY INTERPRETATION: NORMAL
Lab: NORMAL

## 2022-03-28 ENCOUNTER — APPOINTMENT (OUTPATIENT)
Dept: LAB | Facility: CLINIC | Age: 37
End: 2022-03-28
Payer: COMMERCIAL

## 2022-04-05 ENCOUNTER — ROUTINE PRENATAL (OUTPATIENT)
Dept: OBGYN CLINIC | Facility: CLINIC | Age: 37
End: 2022-04-05

## 2022-04-05 ENCOUNTER — APPOINTMENT (OUTPATIENT)
Dept: LAB | Facility: CLINIC | Age: 37
End: 2022-04-05
Payer: COMMERCIAL

## 2022-04-05 VITALS
DIASTOLIC BLOOD PRESSURE: 66 MMHG | OXYGEN SATURATION: 99 % | HEART RATE: 97 BPM | WEIGHT: 154.6 LBS | SYSTOLIC BLOOD PRESSURE: 110 MMHG | HEIGHT: 67 IN | BODY MASS INDEX: 24.27 KG/M2 | TEMPERATURE: 96.8 F

## 2022-04-05 DIAGNOSIS — O09.521 SUPERVISION OF HIGH RISK ELDERLY MULTIGRAVIDA IN FIRST TRIMESTER: ICD-10-CM

## 2022-04-05 DIAGNOSIS — O26.851 SPOTTING AFFECTING PREGNANCY IN FIRST TRIMESTER: ICD-10-CM

## 2022-04-05 DIAGNOSIS — Z14.8 GENETIC CARRIER OF OTHER DISEASE: ICD-10-CM

## 2022-04-05 DIAGNOSIS — E03.8 SUBCLINICAL HYPOTHYROIDISM: Primary | ICD-10-CM

## 2022-04-05 DIAGNOSIS — E03.8 OTHER SPECIFIED HYPOTHYROIDISM: ICD-10-CM

## 2022-04-05 DIAGNOSIS — Z3A.17 17 WEEKS GESTATION OF PREGNANCY: ICD-10-CM

## 2022-04-05 LAB — TSH SERPL DL<=0.05 MIU/L-ACNC: 2.57 UIU/ML (ref 0.45–4.5)

## 2022-04-05 PROCEDURE — 36415 COLL VENOUS BLD VENIPUNCTURE: CPT

## 2022-04-05 PROCEDURE — PNV: Performed by: OBSTETRICS & GYNECOLOGY

## 2022-04-05 PROCEDURE — 82105 ALPHA-FETOPROTEIN SERUM: CPT

## 2022-04-05 PROCEDURE — 84443 ASSAY THYROID STIM HORMONE: CPT

## 2022-04-05 RX ORDER — LEVOTHYROXINE SODIUM 25 MCG
25 TABLET ORAL DAILY
Qty: 90 TABLET | Refills: 3 | Status: SHIPPED | OUTPATIENT
Start: 2022-04-05 | End: 2022-06-01 | Stop reason: SDUPTHER

## 2022-04-05 NOTE — PROGRESS NOTES
S: 40 y o  [facility] 17w0d here for routine prenatal visit  Chief Complaint   Patient presents with    Routine Prenatal Visit     Pt states that she is having some vaginal itching/irritation, whitish discharge         OB complaints:  Contractions: no  Leakage: no  Bleeding: no  Fetal movement: no      O:  /66 (BP Location: Right arm, Patient Position: Sitting, Cuff Size: Adult)   Pulse 97   Temp (!) 96 8 °F (36 °C) (Tympanic)   Ht 5' 7" (1 702 m)   Wt 70 1 kg (154 lb 9 6 oz)   LMP 2021 (Exact Date)   SpO2 99%   BMI 24 21 kg/m²       Review of Systems   Constitutional: Negative for chills and fever  Eyes: Negative for visual disturbance  Respiratory: Negative for chest tightness and shortness of breath  Cardiovascular: Negative for chest pain  Gastrointestinal: Negative for abdominal pain, diarrhea, nausea and vomiting  Genitourinary: Positive for vaginal discharge  Negative for pelvic pain and vaginal bleeding  As noted in HPI   Skin: Negative for rash  Neurological: Negative for headaches  All other systems reviewed and are negative  Physical Exam  Constitutional:       General: She is not in acute distress  Appearance: Normal appearance  HENT:      Head: Normocephalic and atraumatic  Cardiovascular:      Rate and Rhythm: Normal rate  Pulmonary:      Effort: Pulmonary effort is normal  No respiratory distress  Abdominal:      General: There is no distension  Palpations: Abdomen is soft  Tenderness: There is no abdominal tenderness  There is no guarding or rebound  Comments: gravid   Neurological:      General: No focal deficit present  Mental Status: She is alert  Psychiatric:         Mood and Affect: Mood normal          Behavior: Behavior normal    Vitals and nursing note reviewed                Fetal Heart Rate: 150      Pre-Ashley Vitals      Most Recent Value   Prenatal Assessment    Fetal Heart Rate 150   Movement Absent Prenatal Vitals    Blood Pressure 110/66   Weight - Scale 70 1 kg (154 lb 9 6 oz)   Urine Albumin/Glucose    Dilation/Effacement/Station    Vaginal Drainage    Edema                 Problem List        Endocrine    Subclinical hypothyroidism    Overview     TSH 1/10/22 3 22, synthroid 25 mcg started by RMA  TSH 2/10/22 2 97         Current Assessment & Plan     Repeat TSH ordered            Genitourinary    Spotting affecting pregnancy in first trimester       Other    Supervision of high risk elderly multigravida in first trimester    Overview     RMA reports KYREE by ovulation date as 9/15/22  KYREE by LMP is 9/13/22   Conceived spontaneously, no infertility tx required  S/p COVID vax x 2, declines booster  Declines flu vaccine   Interested in NIPT          Current Assessment & Plan     Reports increased vaginal discharge but not thick and without itching   Reviewed rationale for aspirin  - pt will start taking   msAFP ordered   NIPT not yet resulted  Anatomy US scheduled   Routine anticipatory guidance (travel, food, health weight gain) reviewed  OB, COVID precautions reviewed         17 weeks gestation of pregnancy    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

## 2022-04-05 NOTE — PATIENT INSTRUCTIONS
[facility] Services: for up to date hours, call [phone]  [facility]/lab   Maternal serum AFP test (screening for neural tube defects) done ideally before 18 weeks         Pregnancy at 15 to 18 Weeks   AMBULATORY CARE:   What changes are happening to your body:  Now that you are in your second trimester, you have more energy  You may also feel hungrier than usual  You may start to experience other symptoms, such as heartburn or dizziness  You may be gaining about ½ to 1 pound a week, and your pregnancy is beginning to show  You may need to start wearing maternity clothes  Seek care immediately if:   · You have pain or cramping in your abdomen or low back  · You have heavy vaginal bleeding or clotting  · You pass material that looks like tissue or large clots  Collect the material and bring it with you  Call your doctor or obstetrician if:   · You cannot keep food or drinks down, and you are losing weight  · You have light bleeding  · You have chills or a fever  · You have vaginal itching, burning, or pain  · You have yellow, green, white, or foul-smelling vaginal discharge  · You have pain or burning when you urinate, less urine than usual, or pink or bloody urine  · You have questions or concerns about your condition or care  How to care for yourself at this stage of your pregnancy:       · Manage heartburn  by eating 4 or 5 small meals each day instead of large meals  Avoid spicy foods  Avoid eating right before bedtime  · Manage nausea and vomiting  Avoid fatty and spicy foods  Eat small meals throughout the day instead of large meals  Mary Beth may help to decrease nausea  Ask your healthcare provider about other ways of decreasing nausea and vomiting  · Eat a variety of healthy foods  Healthy foods include fruits, vegetables, whole-grain breads, low-fat dairy foods, beans, lean meats, and fish  Drink liquids as directed   Ask how much liquid to drink each day and which liquids are best for you  Limit caffeine to less than 200 milligrams each day  Limit your intake of fish to 2 servings each week  Choose fish low in mercury such as canned light tuna, shrimp, salmon, cod, or tilapia  Do not  eat fish high in mercury such as swordfish, tilefish, otto mackerel, and shark  · Take prenatal vitamins as directed  Your need for certain vitamins and minerals, such as folic acid, increases during pregnancy  Prenatal vitamins provide some of the extra vitamins and minerals you need  Prenatal vitamins may also help to decrease the risk of certain birth defects  · Do not smoke  Smoking increases your risk of a miscarriage and other health problems during your pregnancy  Smoking can cause your baby to be born too early or weigh less at birth  Ask your healthcare provider for information if you need help quitting  · Do not drink alcohol  Alcohol passes from your body to your baby through the placenta  It can affect your baby's brain development and cause fetal alcohol syndrome (FAS)  FAS is a group of conditions that causes mental, behavior, and growth problems  · Talk to your healthcare provider before you take any medicines  Many medicines may harm your baby if you take them when you are pregnant  Do not take any medicines, vitamins, herbs, or supplements without first talking to your healthcare provider  Never use illegal or street drugs (such as marijuana or cocaine) while you are pregnant  Safety tips during pregnancy:   · Avoid hot tubs and saunas  Do not use a hot tub or sauna while you are pregnant, especially during your first trimester  Hot tubs and saunas may raise your baby's temperature and increase the risk of birth defects  · Avoid toxoplasmosis  This is an infection caused by eating raw meat or being around infected cat feces  It can cause birth defects, miscarriages, and other problems  Wash your hands after you touch raw meat  Make sure any meat is well-cooked before you eat it  Avoid raw eggs and unpasteurized milk  Use gloves or ask someone else to clean your cat's litter box while you are pregnant  Changes that are happening with your baby:  By 18 weeks, your baby may be about 6 inches long from the top of the head to the rump (baby's bottom)  Your baby may weigh about 11 ounces  You may be able to feel your baby's movement at about 18 weeks or later  The first movements may not be that noticeable  They may feel like a fluttering sensation  Your baby also makes sucking movements and can hear certain sounds  What you need to know about prenatal care:  During the first 28 weeks of your pregnancy, you will see your healthcare provider once a month  Your healthcare provider will check your blood pressure and weight  You may also need any of the following:  · A urine test  may also be done to check for sugar and protein  These can be signs of gestational diabetes or infection  · A blood test  may be done to check for anemia (low iron level)  · Fundal height check  is a measurement of your uterus to check your baby's growth  This number is usually the same as the number of weeks that you have been pregnant  · An ultrasound  may be done to check your baby's development  Your healthcare provider may be able to tell you what your baby's gender is during the ultrasound  · Your baby's heart rate  will be checked  © Copyright [address] 2022 Information is for End User's use only and may not be sold, redistributed or otherwise used for commercial purposes  All illustrations and images included in CareNotes® are the copyrighted property of [employer]  or [facility] Dalia Morgan   The above information is an  only  It is not intended as medical advice for individual conditions or treatments   Talk to your doctor, nurse or pharmacist before following any medical regimen to see if it is safe and effective for you

## 2022-04-05 NOTE — ASSESSMENT & PLAN NOTE
Reports increased vaginal discharge but not thick and without itching   Reviewed rationale for aspirin  - pt will start taking   msAFP ordered   NIPT not yet resulted  Anatomy US scheduled   Routine anticipatory guidance (travel, food, health weight gain) reviewed  OB, COVID precautions reviewed

## 2022-04-06 ENCOUNTER — TELEPHONE (OUTPATIENT)
Dept: PERINATAL CARE | Facility: OTHER | Age: 37
End: 2022-04-06

## 2022-04-06 NOTE — TELEPHONE ENCOUNTER
Telephone call to patient  Reported NIPT results screen negative  Patient did not wish to be informed of fetal sex  Patient offers no questions or concerns at this time

## 2022-04-06 NOTE — TELEPHONE ENCOUNTER
----- Message from Manish Zamora MD sent at 4/6/2022  9:21 AM EDT -----  I reviewed the lab study today and the results revealed low risks for trisomy 21, 25, 15, and sex chromosome aneuploidies

## 2022-04-07 LAB
2ND TRIMESTER 4 SCREEN SERPL-IMP: NORMAL
AFP ADJ MOM SERPL: 0.8
AFP INTERP AMN-IMP: NORMAL
AFP INTERP SERPL-IMP: NORMAL
AFP INTERP SERPL-IMP: NORMAL
AFP SERPL-MCNC: 31.5 NG/ML
AGE AT DELIVERY: 36.4 YR
GA METHOD: NORMAL
GA: 17 WEEKS
IDDM PATIENT QL: NO
MULTIPLE PREGNANCY: NO
NEURAL TUBE DEFECT RISK FETUS: NORMAL %

## 2022-04-28 ENCOUNTER — ROUTINE PRENATAL (OUTPATIENT)
Dept: PERINATAL CARE | Facility: OTHER | Age: 37
End: 2022-04-28
Payer: COMMERCIAL

## 2022-04-28 VITALS
WEIGHT: 158.8 LBS | DIASTOLIC BLOOD PRESSURE: 79 MMHG | SYSTOLIC BLOOD PRESSURE: 120 MMHG | BODY MASS INDEX: 24.92 KG/M2 | HEIGHT: 67 IN | HEART RATE: 89 BPM

## 2022-04-28 DIAGNOSIS — Z36.3 ENCOUNTER FOR ANTENATAL SCREENING FOR MALFORMATIONS: ICD-10-CM

## 2022-04-28 DIAGNOSIS — Z36.86 ENCOUNTER FOR ANTENATAL SCREENING FOR CERVICAL LENGTH: ICD-10-CM

## 2022-04-28 DIAGNOSIS — O09.519 ADVANCED MATERNAL AGE, PRIMIGRAVIDA, ANTEPARTUM: Primary | ICD-10-CM

## 2022-04-28 PROCEDURE — 76817 TRANSVAGINAL US OBSTETRIC: CPT | Performed by: OBSTETRICS & GYNECOLOGY

## 2022-04-28 PROCEDURE — 76811 OB US DETAILED SNGL FETUS: CPT | Performed by: OBSTETRICS & GYNECOLOGY

## 2022-04-28 PROCEDURE — 99213 OFFICE O/P EST LOW 20 MIN: CPT | Performed by: OBSTETRICS & GYNECOLOGY

## 2022-04-28 NOTE — PATIENT INSTRUCTIONS
Thank you for choosing us for your  care today  If you have any questions about your ultrasound or care, please do not hesitate to contact us or your primary obstetrician  Some general instructions for your pregnancy are:     Protect against coronavirus: get vaccinated - pregnant women are increased risk of severe COVID  Notify your primary care doctor if you have any symptoms   Exercise: Aim for 22 minutes per day (150 minutes per week) of regular exercise  Walking is great!  Nutrition: aim for calcium-rich and iron-rich foods as well as healthy sources of protein   Learn about Preeclampsia: preeclampsia is a common, serious high blood pressure complication in pregnancy  A blood pressure of 736SRGM (systolic or top number) or 81EPEA (diastolic or bottom number) is not normal and needs evaluation by your doctor  Aspirin is sometimes prescribed in early pregnancy to prevent preeclampsia in women with risk factors - ask your obstetrician if you should be on this medication   If you smoke, try to reduce how many cigarettes you smoke or try to quit completely  Do not vape   Other warning signs to watch out for in pregnancy or postpartum: chest pain, obstructed breathing or shortness of breath, seizures, thoughts of hurting yourself or your baby, bleeding, a painful or swollen leg, fever, or headache (see [facility] POST-BIRTH Warning Signs campaign)  If these happen call 911  Itching is also not normal in pregnancy and if you experience this, especially over your hands and feet, potentially worse at night, notify your doctors

## 2022-04-28 NOTE — PROGRESS NOTES
[address]: Ms Francesca Brooke was seen today for anatomic survey and cervical length screening ultrasound  See ultrasound report under "OB Procedures" tab  The time spent on this established patient on the encounter date included 5 minutes previsit service time reviewing records and precharting, 10 minutes face-to-face service time counseling regarding results and coordinating care, and  5 minutes charting, totalling 20 minutes  Please don't hesitate to contact our office with any concerns or questions    Navdeep Workman MD

## 2022-05-19 ENCOUNTER — TELEPHONE (OUTPATIENT)
Dept: OBGYN CLINIC | Facility: CLINIC | Age: 37
End: 2022-05-19

## 2022-05-19 NOTE — TELEPHONE ENCOUNTER
PT called stating that she is experiencing allergy sx -congestion, fatigue, some coughing  She is asking if she is able to take Xyzal for her sx, if not, what do you recommend? Please advise  Thank you

## 2022-05-24 PROBLEM — Z3A.24 24 WEEKS GESTATION OF PREGNANCY: Status: ACTIVE | Noted: 2022-02-15

## 2022-05-24 PROBLEM — O26.851 SPOTTING AFFECTING PREGNANCY IN FIRST TRIMESTER: Status: RESOLVED | Noted: 2022-03-10 | Resolved: 2022-05-24

## 2022-05-24 PROBLEM — O09.522 ELDERLY MULTIGRAVIDA, SECOND TRIMESTER: Status: ACTIVE | Noted: 2022-02-15

## 2022-05-24 NOTE — PATIENT INSTRUCTIONS
Pregnancy at 23 to 26 [address]:   You are now close to or at the beginning of the third trimester  The third trimester starts at 24 weeks and ends with delivery  As your baby gets larger, you may develop certain symptoms  These may include pain in your back or down the sides of your abdomen  You may also have stretch marks on your abdomen, breasts, thighs, or buttocks  You may also have constipation  DISCHARGE INSTRUCTIONS:   Return to the emergency department if:   You develop a severe headache that does not go away  You have new or increased vision changes, such as blurred or spotted vision  You have new or increased swelling in your face or hands  You have vaginal spotting or bleeding  Your water broke or you feel warm water gushing or trickling from your vagina  Call your doctor or obstetrician if:   You have abdominal cramps, pressure, or tightening  You have a change in vaginal discharge  You have light bleeding  You have chills or a fever  You have vaginal itching, burning, or pain  You have yellow, green, white, or foul-smelling vaginal discharge  You have pain or burning when you urinate, less urine than usual, or pink or bloody urine  You have questions or concerns about your condition or care  How to care for yourself at this stage of your pregnancy:       Eat a variety of healthy foods  Healthy foods include fruits, vegetables, whole-grain breads, low-fat dairy foods, beans, lean meats, and fish  Drink liquids as directed  Ask how much liquid to drink each day and which liquids are best for you  Limit caffeine to less than 200 milligrams each day  Limit your intake of fish to 2 servings each week  Choose fish low in mercury such as canned light tuna, shrimp, salmon, cod, or tilapia  Do not  eat fish high in mercury such as swordfish, tilefish, otto mackerel, and shark  Manage back pain    Do not stand for long periods of time or lift heavy items  Use good posture while you stand, squat, or bend  Wear low-heeled shoes with good support  Rest may also help to relieve back pain  Ask your healthcare provider about exercises you can do to strengthen your back muscles  Take prenatal vitamins as directed  Your need for certain vitamins and minerals, such as folic acid, increases during pregnancy  Prenatal vitamins provide some of the extra vitamins and minerals you need  Prenatal vitamins may also help to decrease the risk of certain birth defects  Talk to your healthcare provider about exercise  Moderate exercise can help you stay fit  Your healthcare provider will help you plan an exercise program that is safe for you during pregnancy  Do not smoke  Smoking increases your risk of a miscarriage and other health problems during your pregnancy  Smoking can cause your baby to be born too early or weigh less at birth  Ask your healthcare provider for information if you need help quitting  Do not drink alcohol  Alcohol passes from your body to your baby through the placenta  It can affect your baby's brain development and cause fetal alcohol syndrome (FAS)  FAS is a group of conditions that causes mental, behavior, and growth problems  Talk to your healthcare provider before you take any medicines  Many medicines may harm your baby if you take them when you are pregnant  Do not take any medicines, vitamins, herbs, or supplements without first talking to your healthcare provider  Never use illegal or street drugs (such as marijuana or cocaine) while you are pregnant  Safety tips:   Avoid hot tubs and saunas  Do not use a hot tub or sauna while you are pregnant, especially during your first trimester  Hot tubs and saunas may raise your baby's temperature and increase the risk of birth defects  Avoid toxoplasmosis  This is an infection caused by eating raw meat or being around infected cat feces   It can cause birth defects, miscarriages, and other problems  Wash your hands after you touch raw meat  Make sure any meat is well-cooked before you eat it  Avoid raw eggs and unpasteurized milk  Use gloves or ask someone else to clean your cat's litter box while you are pregnant  Changes that are happening with your baby:  By 26 weeks, your baby will weigh about 2 pounds  Your baby will be about 10 inches long from the top of the head to the rump (baby's bottom)  Your baby's movements are much stronger now  Your baby's eyes are almost completely formed and can partially open  Your baby also sleeps and wakes up  What you need to know about prenatal care: Your healthcare provider will check your blood pressure and weight  You may also need the following:  A urine test  may also be done to check for sugar and protein  These can be signs of gestational diabetes or infection  Protein in your urine may also be a sign of preeclampsia  Preeclampsia is a condition that can develop during week 20 or later of your pregnancy  It causes high blood pressure, and it can cause problems with your kidneys and other organs  A gestational diabetes screen  may be done  Your healthcare provider may order either a 1-step or 2-step oral glucose tolerance test (OGTT)  1-step OGTT:  Your blood sugar level will be tested after you have not eaten for 8 hours (fasting)  You will then be given a glucose drink  Your level will be tested again 1 hour and 2 hours after you finish the drink  2-step OGTT:  You do not have to fast for the first part of the test  You will have the glucose drink at any time of day  Your blood sugar level will be checked 1 hour later  If your blood sugar is higher than a certain level, another test will be ordered  You will fast and your blood sugar level will be tested  You will have the glucose drink  Your blood will be tested again 1 hour, 2 hours, and 3 hours after you finish the glucose drink      Fundal height  is a measurement of your uterus to check your baby's growth  This number is usually the same as the number of weeks that you have been pregnant  Your baby's heart rate  will be checked  Follow up with your doctor or obstetrician as directed:  Write down your questions so you remember to ask them during your visits  © [employer] 2022 Information is for End User's use only and may not be sold, redistributed or otherwise used for commercial purposes  All illustrations and images included in CareNotes® are the copyrighted property of [employer]  or [facility] Dalia Morgan   The above information is an  only  It is not intended as medical advice for individual conditions or treatments  Talk to your doctor, nurse or pharmacist before following any medical regimen to see if it is safe and effective for you

## 2022-05-24 NOTE — PROGRESS NOTES
OB/GYN  PN Visit  Fouzia Carlson  [id number]  2022  11:47 AM  Dr Antonio Allen MD    S: 40 y o  Willard Serge 24w4d here for PN visit  Chief Complaint   Patient presents with    Routine Prenatal Visit     24 weeks, no problems or concerns         OB complaints:  Contractions: no  Leakage: no  Bleeding: no  Fetal movement: yes      O:  /60   Pulse 78   Ht 5' 7" (1 702 m)   Wt 74 6 kg (164 lb 6 4 oz)   LMP 2021 (Exact Date)   BMI 25 75 kg/m²       Review of Systems   Constitutional: Negative  HENT: Negative  Eyes: Negative  Respiratory: Negative  Cardiovascular: Negative  Gastrointestinal: Negative  Endocrine: Negative  Genitourinary:        As noted in HPI   Musculoskeletal: Negative  Skin: Negative  Allergic/Immunologic: Negative  Neurological: Negative  Hematological: Negative  Psychiatric/Behavioral: Negative  Physical Exam  Constitutional:       General: She is not in acute distress  Appearance: She is well-developed  Abdominal:      Palpations: Abdomen is soft  Tenderness: There is no abdominal tenderness  There is no guarding  Neurological:      Mental Status: She is alert and oriented to person, place, and time  Skin:     General: Skin is warm and dry     Psychiatric:         Behavior: Behavior normal              Pregravid Weight/BMI: 61 7 kg (136 lb) (BMI 21 30)  Current Weight: 74 6 kg (164 lb 6 4 oz)   Total Weight Gain: 12 9 kg (28 lb 6 4 oz)   Pre- Vitals    Flowsheet Row Most Recent Value   Prenatal Assessment    Fetal Heart Rate 141   Movement Present   Prenatal Vitals    Blood Pressure 100/60   Weight - Scale 74 6 kg (164 lb 6 4 oz)   Urine Albumin/Glucose    Dilation/Effacement/Station    Vaginal Drainage    Edema            Problem List        Endocrine    Subclinical hypothyroidism    Overview     TSH 1/10/22 3 22, synthroid 25 mcg started by RMA  TSH 2/10/22 2 97              Other    Elderly multigravida, second trimester    Overview     RMA reports KYREE by ovulation date as 9/15/22   KYREE by LMP is 9/13/22   Conceived spontaneously, no infertility tx required  S/p COVID vax x 2, declines booster  Declines flu vaccine   NIPT, msAFP low risk            24 weeks gestation of pregnancy    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

## 2022-05-27 ENCOUNTER — LAB (OUTPATIENT)
Dept: LAB | Facility: CLINIC | Age: 37
End: 2022-05-27
Payer: COMMERCIAL

## 2022-05-27 ENCOUNTER — ROUTINE PRENATAL (OUTPATIENT)
Dept: OBGYN CLINIC | Facility: CLINIC | Age: 37
End: 2022-05-27

## 2022-05-27 VITALS
DIASTOLIC BLOOD PRESSURE: 60 MMHG | HEIGHT: 67 IN | HEART RATE: 78 BPM | SYSTOLIC BLOOD PRESSURE: 100 MMHG | BODY MASS INDEX: 25.8 KG/M2 | WEIGHT: 164.4 LBS

## 2022-05-27 DIAGNOSIS — Z3A.24 24 WEEKS GESTATION OF PREGNANCY: ICD-10-CM

## 2022-05-27 DIAGNOSIS — Z34.92 SECOND TRIMESTER PREGNANCY: Primary | ICD-10-CM

## 2022-05-27 DIAGNOSIS — O09.522 ELDERLY MULTIGRAVIDA, SECOND TRIMESTER: ICD-10-CM

## 2022-05-27 DIAGNOSIS — Z14.8 GENETIC CARRIER OF OTHER DISEASE: ICD-10-CM

## 2022-05-27 DIAGNOSIS — E03.8 SUBCLINICAL HYPOTHYROIDISM: ICD-10-CM

## 2022-05-27 LAB
BASOPHILS # BLD AUTO: 0.03 THOUSANDS/ΜL (ref 0–0.1)
BASOPHILS NFR BLD AUTO: 0 % (ref 0–1)
EOSINOPHIL # BLD AUTO: 0.07 THOUSAND/ΜL (ref 0–0.61)
EOSINOPHIL NFR BLD AUTO: 1 % (ref 0–6)
ERYTHROCYTE [DISTWIDTH] IN BLOOD BY AUTOMATED COUNT: 13.5 % (ref 11.6–15.1)
GLUCOSE 1H P 50 G GLC PO SERPL-MCNC: 74 MG/DL (ref 40–134)
HCT VFR BLD AUTO: 35.3 % (ref 34.8–46.1)
HGB BLD-MCNC: 11.9 G/DL (ref 11.5–15.4)
IMM GRANULOCYTES # BLD AUTO: 0.05 THOUSAND/UL (ref 0–0.2)
IMM GRANULOCYTES NFR BLD AUTO: 1 % (ref 0–2)
LYMPHOCYTES # BLD AUTO: 2.31 THOUSANDS/ΜL (ref 0.6–4.47)
LYMPHOCYTES NFR BLD AUTO: 26 % (ref 14–44)
MCH RBC QN AUTO: 31.4 PG (ref 26.8–34.3)
MCHC RBC AUTO-ENTMCNC: 33.7 G/DL (ref 31.4–37.4)
MCV RBC AUTO: 93 FL (ref 82–98)
MONOCYTES # BLD AUTO: 0.59 THOUSAND/ΜL (ref 0.17–1.22)
MONOCYTES NFR BLD AUTO: 7 % (ref 4–12)
NEUTROPHILS # BLD AUTO: 5.96 THOUSANDS/ΜL (ref 1.85–7.62)
NEUTS SEG NFR BLD AUTO: 65 % (ref 43–75)
NRBC BLD AUTO-RTO: 0 /100 WBCS
PLATELET # BLD AUTO: 253 THOUSANDS/UL (ref 149–390)
PMV BLD AUTO: 12.6 FL (ref 8.9–12.7)
RBC # BLD AUTO: 3.79 MILLION/UL (ref 3.81–5.12)
TSH SERPL DL<=0.05 MIU/L-ACNC: 1.97 UIU/ML (ref 0.45–4.5)
WBC # BLD AUTO: 9.01 THOUSAND/UL (ref 4.31–10.16)

## 2022-05-27 PROCEDURE — 84443 ASSAY THYROID STIM HORMONE: CPT

## 2022-05-27 PROCEDURE — 86592 SYPHILIS TEST NON-TREP QUAL: CPT

## 2022-05-27 PROCEDURE — PNV: Performed by: OBSTETRICS & GYNECOLOGY

## 2022-05-27 PROCEDURE — 85025 COMPLETE CBC W/AUTO DIFF WBC: CPT

## 2022-05-27 PROCEDURE — 82950 GLUCOSE TEST: CPT

## 2022-05-27 PROCEDURE — 36415 COLL VENOUS BLD VENIPUNCTURE: CPT

## 2022-05-28 LAB — RPR SER QL: NORMAL

## 2022-06-01 ENCOUNTER — TELEPHONE (OUTPATIENT)
Dept: OBGYN CLINIC | Facility: CLINIC | Age: 37
End: 2022-06-01

## 2022-06-01 DIAGNOSIS — E03.8 OTHER SPECIFIED HYPOTHYROIDISM: ICD-10-CM

## 2022-06-01 RX ORDER — LEVOTHYROXINE SODIUM 25 MCG
25 TABLET ORAL DAILY
Qty: 90 TABLET | Refills: 3 | Status: SHIPPED | OUTPATIENT
Start: 2022-06-01

## 2022-06-01 NOTE — TELEPHONE ENCOUNTER
----- Message from Anjelica Feng sent at 5/31/2022  8:47 AM EDT -----  Patient is aware of her test results  Would like to know if she could get a new prescription sent for Synthroid

## 2022-07-22 ENCOUNTER — TELEPHONE (OUTPATIENT)
Dept: PERINATAL CARE | Facility: OTHER | Age: 37
End: 2022-07-22

## 2022-07-22 NOTE — TELEPHONE ENCOUNTER
Called patient to schedule follow up appointment cancelled in [facility]:    Appointment canceled for Fernanda Read ([id number])   Visit Type: ULTRASOUND PG   Date        Time      Length    Provider                  Department   2022   10:15 AM  30 mins     [facility] [address]      Reason for Cancellation: Canceled via [facility] with patient - she moved and has transferred care elsewhere

## 2023-07-19 ENCOUNTER — OFFICE VISIT (OUTPATIENT)
Dept: OBGYN CLINIC | Facility: CLINIC | Age: 38
End: 2023-07-19
Payer: COMMERCIAL

## 2023-07-19 VITALS
DIASTOLIC BLOOD PRESSURE: 70 MMHG | HEIGHT: 67 IN | SYSTOLIC BLOOD PRESSURE: 122 MMHG | WEIGHT: 164 LBS | BODY MASS INDEX: 25.74 KG/M2

## 2023-07-19 DIAGNOSIS — Z3A.01 7 WEEKS GESTATION OF PREGNANCY: ICD-10-CM

## 2023-07-19 DIAGNOSIS — N91.2 AMENORRHEA: Primary | ICD-10-CM

## 2023-07-19 DIAGNOSIS — O21.9 NAUSEA AND VOMITING DURING PREGNANCY: ICD-10-CM

## 2023-07-19 PROBLEM — O09.522 ELDERLY MULTIGRAVIDA, SECOND TRIMESTER: Status: RESOLVED | Noted: 2022-02-15 | Resolved: 2023-07-19

## 2023-07-19 PROBLEM — Z3A.24 24 WEEKS GESTATION OF PREGNANCY: Status: RESOLVED | Noted: 2022-02-15 | Resolved: 2023-07-19

## 2023-07-19 LAB — SL AMB POCT URINE HCG: POSITIVE

## 2023-07-19 PROCEDURE — 99213 OFFICE O/P EST LOW 20 MIN: CPT | Performed by: OBSTETRICS & GYNECOLOGY

## 2023-07-19 PROCEDURE — 81025 URINE PREGNANCY TEST: CPT | Performed by: OBSTETRICS & GYNECOLOGY

## 2023-07-19 PROCEDURE — 76817 TRANSVAGINAL US OBSTETRIC: CPT | Performed by: OBSTETRICS & GYNECOLOGY

## 2023-07-19 RX ORDER — OMEPRAZOLE 20 MG/1
20 TABLET, DELAYED RELEASE ORAL DAILY
Qty: 90 TABLET | Refills: 0 | Status: SHIPPED | OUTPATIENT
Start: 2023-07-19

## 2023-07-19 NOTE — ASSESSMENT & PLAN NOTE
KEIRA seen c/w stated LMP, KYREE updated  She will be receiving care in the [facility].  Advised we can send documentation of today's US and necessary records when she has an office  Precautions reviewed

## 2023-07-19 NOTE — PATIENT INSTRUCTIONS
- Avoiding fasting for prolonged periods of time - a simple snack such as crackers upon waking may be helpful.   - Meals and snacks should be eaten slowly and in small amounts every one to two hours to avoid an overly full stomach  - Don't lie down immediately after eating  - Avoid triggers foods: examples include excessively spicy, odorous, high-fat, acidic, very sweet foods, coffee, soda  - Hard peppermint candies, berta chews may be helpful    - Vitamin B6 (pyridoxine) 25-50 mg every 6-8 hours  - Unisom (doxylamine) 25 mg 1-2 tabs at night
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
